# Patient Record
Sex: FEMALE | Race: WHITE | NOT HISPANIC OR LATINO | URBAN - METROPOLITAN AREA
[De-identification: names, ages, dates, MRNs, and addresses within clinical notes are randomized per-mention and may not be internally consistent; named-entity substitution may affect disease eponyms.]

---

## 2018-12-12 ENCOUNTER — OFFICE (OUTPATIENT)
Dept: URBAN - METROPOLITAN AREA CLINIC 75 | Facility: CLINIC | Age: 33
End: 2018-12-12

## 2018-12-12 VITALS
RESPIRATION RATE: 14 BRPM | SYSTOLIC BLOOD PRESSURE: 114 MMHG | HEART RATE: 64 BPM | HEIGHT: 66 IN | DIASTOLIC BLOOD PRESSURE: 68 MMHG | WEIGHT: 152 LBS

## 2018-12-12 DIAGNOSIS — R14.0 ABDOMINAL DISTENSION (GASEOUS): ICD-10-CM

## 2018-12-12 DIAGNOSIS — R11.2 NAUSEA WITH VOMITING, UNSPECIFIED: ICD-10-CM

## 2018-12-12 DIAGNOSIS — R19.7 DIARRHEA, UNSPECIFIED: ICD-10-CM

## 2018-12-12 PROCEDURE — 99204 OFFICE O/P NEW MOD 45 MIN: CPT | Performed by: NURSE PRACTITIONER

## 2018-12-12 RX ORDER — OMEPRAZOLE 40 MG/1
40 CAPSULE, DELAYED RELEASE ORAL
Qty: 30 | Refills: 4 | Status: ACTIVE
Start: 2018-12-12

## 2020-06-17 ENCOUNTER — OFFICE VISIT (OUTPATIENT)
Dept: INTERNAL MEDICINE | Facility: CLINIC | Age: 35
End: 2020-06-17

## 2020-06-17 VITALS — WEIGHT: 137 LBS | TEMPERATURE: 97.5 F | BODY MASS INDEX: 21.5 KG/M2 | HEIGHT: 67 IN

## 2020-06-17 DIAGNOSIS — E06.3 HYPOTHYROIDISM DUE TO HASHIMOTO'S THYROIDITIS: Primary | ICD-10-CM

## 2020-06-17 DIAGNOSIS — E03.8 HYPOTHYROIDISM DUE TO HASHIMOTO'S THYROIDITIS: Primary | ICD-10-CM

## 2020-06-17 DIAGNOSIS — H60.391 INFECTION OF EAR LOBE, RIGHT: ICD-10-CM

## 2020-06-17 PROBLEM — E03.9 HYPOTHYROIDISM: Status: ACTIVE | Noted: 2018-01-29

## 2020-06-17 PROBLEM — Q65.89 HIP DYSPLASIA: Status: ACTIVE | Noted: 2018-01-29

## 2020-06-17 PROCEDURE — 99203 OFFICE O/P NEW LOW 30 MIN: CPT | Performed by: INTERNAL MEDICINE

## 2020-06-17 RX ORDER — FEXOFENADINE HCL AND PSEUDOEPHEDRINE HCI 180; 240 MG/1; MG/1
1 TABLET, EXTENDED RELEASE ORAL DAILY
COMMUNITY
End: 2020-07-14 | Stop reason: SDUPTHER

## 2020-06-17 NOTE — PROGRESS NOTES
Assessment and Plan  Lyly was seen today for thyroid problem.    Diagnoses and all orders for this visit:    Hypothyroidism due to Hashimoto's thyroiditis  Comments:  check TSH today- other labs for health screening.   Orders:  -     TSH  -     Comprehensive Metabolic Panel  -     Lipid Panel With / Chol / HDL Ratio    Infection of ear lobe, right  Comments:  put in a small gold earring and do not manipulate area.       F/U and Patient Instructions    No follow-ups on file.  There are no Patient Instructions on file for this visit.    Subjective    Lyly OGDEN is a 34 y.o. female being seen in our office today for Thyroid Problem     History of the Present Illness  HPI Here to establish- 2 issues- had a baby 8/18 after 4 years of inferttility treatments.  She was put on levothyroxine during that period.  It was d/c'd at delivery.  Lately she notices that she is hot during the night- might have gotten better lately- can't stand sleeping close to her  during the night.   She menstruates regularly, had a tubal.   Hip dysplasia, repaired at 14 and final done at 17.    Patient History        Significant Past History  The following portions of the patient's history were reviewed and updated as appropriate:PMHroutine: Social history , Allergies, Current Medications, Active Problem List and Health Maintenance              Social History  She  reports that she has never smoked. She has never used smokeless tobacco. She reports that she drinks alcohol. She reports that she does not use drugs.                         Review of Symptoms  Review of Systems   Constitution: Negative.   HENT: Ear pain: infection of higher ear piercings.    Cardiovascular: Negative.    Respiratory: Negative.    Endocrine: Positive for heat intolerance.   Musculoskeletal: Negative.    Genitourinary: Negative.    Psychiatric/Behavioral: Negative.      Objective  Vital Signs         BP Readings from Last 1 Encounters:   No data found for  BP     Wt Readings from Last 3 Encounters:   06/17/20 62.1 kg (137 lb)   Body mass index is 21.78 kg/m².          Physical Exam   Physical Exam   Constitutional: No distress.   Neck: No thyromegaly present.   Cardiovascular: Normal rate and regular rhythm.   Musculoskeletal: She exhibits no edema.     Data Reviewed    No results found for this or any previous visit (from the past 2016 hour(s)).

## 2020-06-18 ENCOUNTER — TELEPHONE (OUTPATIENT)
Dept: INTERNAL MEDICINE | Facility: CLINIC | Age: 35
End: 2020-06-18

## 2020-06-18 LAB
ALBUMIN SERPL-MCNC: 4.5 G/DL (ref 3.5–5.2)
ALBUMIN/GLOB SERPL: 1.9 G/DL
ALP SERPL-CCNC: 46 U/L (ref 39–117)
ALT SERPL-CCNC: 9 U/L (ref 1–33)
AST SERPL-CCNC: 13 U/L (ref 1–32)
BILIRUB SERPL-MCNC: 0.4 MG/DL (ref 0.2–1.2)
BUN SERPL-MCNC: 14 MG/DL (ref 6–20)
BUN/CREAT SERPL: 15.9 (ref 7–25)
CALCIUM SERPL-MCNC: 9.1 MG/DL (ref 8.6–10.5)
CHLORIDE SERPL-SCNC: 100 MMOL/L (ref 98–107)
CHOLEST SERPL-MCNC: 181 MG/DL (ref 0–200)
CHOLEST/HDLC SERPL: 2.74 {RATIO}
CO2 SERPL-SCNC: 27.6 MMOL/L (ref 22–29)
CREAT SERPL-MCNC: 0.88 MG/DL (ref 0.57–1)
GLOBULIN SER CALC-MCNC: 2.4 GM/DL
GLUCOSE SERPL-MCNC: 84 MG/DL (ref 65–99)
HDLC SERPL-MCNC: 66 MG/DL (ref 40–60)
LDLC SERPL CALC-MCNC: 102 MG/DL (ref 0–100)
POTASSIUM SERPL-SCNC: 4.4 MMOL/L (ref 3.5–5.2)
PROT SERPL-MCNC: 6.9 G/DL (ref 6–8.5)
SODIUM SERPL-SCNC: 137 MMOL/L (ref 136–145)
TRIGL SERPL-MCNC: 65 MG/DL (ref 0–150)
TSH SERPL DL<=0.005 MIU/L-ACNC: 4.02 UIU/ML (ref 0.27–4.2)
VLDLC SERPL CALC-MCNC: 13 MG/DL

## 2020-07-14 ENCOUNTER — TELEPHONE (OUTPATIENT)
Dept: INTERNAL MEDICINE | Facility: CLINIC | Age: 35
End: 2020-07-14

## 2020-07-14 RX ORDER — FEXOFENADINE HCL AND PSEUDOEPHEDRINE HCI 180; 240 MG/1; MG/1
1 TABLET, EXTENDED RELEASE ORAL DAILY
Qty: 30 TABLET | Refills: 0 | Status: SHIPPED | OUTPATIENT
Start: 2020-07-14 | End: 2020-09-14 | Stop reason: SDUPTHER

## 2020-07-14 NOTE — TELEPHONE ENCOUNTER
Patients , curt, calling and states he would like to request she get a prescription for Allegra D. States she can no longer buy it OTC as she has reached the max amount allowable for the year.     [facility] on [address] [facility].    Curt can be reached at [phone].

## 2020-09-14 ENCOUNTER — TELEPHONE (OUTPATIENT)
Dept: INTERNAL MEDICINE | Facility: CLINIC | Age: 35
End: 2020-09-14

## 2020-09-14 RX ORDER — FEXOFENADINE HCL AND PSEUDOEPHEDRINE HCI 180; 240 MG/1; MG/1
1 TABLET, EXTENDED RELEASE ORAL DAILY
Qty: 30 TABLET | Refills: 0 | Status: SHIPPED | OUTPATIENT
Start: 2020-09-14 | End: 2021-10-05

## 2020-09-14 NOTE — TELEPHONE ENCOUNTER
PATIENT IS EXPERIENCING ALLERGIES AND IS REQUESTING ARLIN NGUYEN      CALL BACK #: [phone]     PHARMACY: [id number] [place], [place] - [address] AT [facility] [address] - [phone]  - [phone] FX

## 2020-11-20 ENCOUNTER — TELEMEDICINE (OUTPATIENT)
Dept: INTERNAL MEDICINE | Facility: CLINIC | Age: 35
End: 2020-11-20

## 2020-11-20 DIAGNOSIS — R09.89 CHEST CONGESTION: ICD-10-CM

## 2020-11-20 DIAGNOSIS — R09.81 SINUS CONGESTION: Primary | ICD-10-CM

## 2020-11-20 PROCEDURE — 99442 PR PHYS/QHP TELEPHONE EVALUATION 11-20 MIN: CPT | Performed by: NURSE PRACTITIONER

## 2020-11-20 RX ORDER — FLUTICASONE PROPIONATE 50 MCG
2 SPRAY, SUSPENSION (ML) NASAL DAILY
Start: 2020-11-20 | End: 2022-02-07

## 2020-11-20 RX ORDER — GUAIFENESIN 600 MG/1
1200 TABLET, EXTENDED RELEASE ORAL 2 TIMES DAILY
Start: 2020-11-20 | End: 2022-02-07

## 2020-11-20 NOTE — PROGRESS NOTES
Subjective   Chief Complaint   Patient presents with   • Sinusitis     You have chosen to receive care through a telephone visit. Do you consent to use a telephone visit for your medical care today? YES    History of Present Illness   35 y.o. female presents via telehealth regarding cc as above ongoing times several days.     Her toddler has really bad allergies but had croup a couple of weeks ago. She now has it too. Started with sinus congestion and has moved into her lungs. No cough just congestion. When she blows her nose or forces herself to cough she has clear mucus/sputum. Moved into her lungs last week. Denies fever or chills. No body aches. Not lost her sense of smell or taste. No body aches or dyspnea. Using Robitussin, Nyquil and humidifier.     Her daughter gets croup and she always gets it too. Pediatrician did not feel she needed COVID testing. Tells me this is the pattern she always has and does not need COVID testing.     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 30 tablet 0     No current facility-administered medications on file prior to visit.        History reviewed. No pertinent past medical history.    History reviewed. No pertinent family history.    Social History     Socioeconomic History   • Marital status:      Spouse name: Not on file   • Number of children: Not on file   • Years of education: Not on file   • Highest education level: Not on file   Tobacco Use   • Smoking status: Never Smoker   • Smokeless tobacco: Never Used   Substance and Sexual Activity   • Alcohol use: Yes   • Drug use: Never       Past Surgical History:    Procedure Laterality Date   •  SECTION     • HIP SURGERY         The following portions of the patient's history were reviewed and updated as appropriate: problem list, allergies, current medications, past medical history and past social history.    Review of Systems   Constitutional: Negative for chills, fatigue and fever.   HENT: Positive for congestion.    Respiratory: Negative for cough and shortness of breath.    Cardiovascular: Negative for chest pain.        Chest congestion.    Musculoskeletal: Negative for myalgias.       Immunization History   Administered Date(s) Administered   • Flu Vaccine Quad PF >36MO 2018   • Tdap 2018       Objective   Vitals:     There is no height or weight on file to calculate BMI.  Physical Exam  Vitals reviewed: Telephone visit.          Procedures    Assessment/Plan   Diagnoses and all orders for this visit:    1. Sinus congestion (Primary)  -     guaiFENesin (Mucinex) 600 MG 12 hr tablet; Take 2 tablets by mouth 2 (Two) Times a Day.  -     fluticasone (Flonase) 50 MCG/ACT nasal spray; 2 sprays into the nostril(s) as directed by provider Daily.  Dispense:      2. Chest congestion  -     guaiFENesin (Mucinex) 600 MG 12 hr tablet; Take 2 tablets by mouth 2 (Two) Times a Day.    Will start Stahist she has at home as well--understands to hold Allegra while taking Stahist.     Discussed COVID testing but adamant she does not have COVID stating this is her typical pattern this time of year. Understands to let us know should anything change or new symptoms develop; otherwise will treat symptomatically.     Records reviewed include previous OV as well as labs.     15 minutes spent via telehealth with patient.      Return for Next scheduled follow up.

## 2021-04-16 ENCOUNTER — BULK ORDERING (OUTPATIENT)
Dept: CASE MANAGEMENT | Facility: OTHER | Age: 36
End: 2021-04-16

## 2021-04-16 DIAGNOSIS — Z23 IMMUNIZATION DUE: ICD-10-CM

## 2021-05-20 ENCOUNTER — OFFICE VISIT (OUTPATIENT)
Dept: INTERNAL MEDICINE | Facility: CLINIC | Age: 36
End: 2021-05-20

## 2021-05-20 VITALS — TEMPERATURE: 97.3 F | WEIGHT: 138 LBS | HEIGHT: 67 IN | BODY MASS INDEX: 21.66 KG/M2

## 2021-05-20 DIAGNOSIS — L23.89 ALLERGIC CONTACT DERMATITIS DUE TO OTHER AGENTS: Primary | ICD-10-CM

## 2021-05-20 PROCEDURE — 99213 OFFICE O/P EST LOW 20 MIN: CPT | Performed by: NURSE PRACTITIONER

## 2021-05-20 RX ORDER — CETIRIZINE HYDROCHLORIDE 10 MG/1
10 TABLET ORAL DAILY
Start: 2021-05-20 | End: 2021-10-05

## 2021-05-20 NOTE — PROGRESS NOTES
Subjective   Chief Complaint   Patient presents with   • Rash       History of Present Illness   35 y.o. female presnet with cc as above ongoing times 5 days. Wore a new pair of legs prior to washing them. Broke out in rash the next day. Very itchy. Rash up and down legs up to her gluts bilaterally. Used cortisone cream.. Improved. Recurred this morning but after using Cortisone cream it is gone again.      Patient Active Problem List   Diagnosis   • Hypothyroidism   • Hip dysplasia       No Known Allergies    Current Outpatient Medications on File Prior to Visit   Medication Sig Dispense Refill   • fexofenadine-pseudoephedrine (ALLEGRA-D 24) 180-240 MG per 24 hr tablet Take 1 tablet by mouth Daily. 30 tablet 0   • fluticasone (Flonase) 50 MCG/ACT nasal spray 2 sprays into the nostril(s) as directed by provider Daily.     • guaiFENesin (Mucinex) 600 MG 12 hr tablet Take 2 tablets by mouth 2 (Two) Times a Day.       No current facility-administered medications on file prior to visit.       History reviewed. No pertinent past medical history.    History reviewed. No pertinent family history.    Social History     Socioeconomic History   • Marital status:      Spouse name: Not on file   • Number of children: Not on file   • Years of education: Not on file   • Highest education level: Not on file   Tobacco Use   • Smoking status: Never Smoker   • Smokeless tobacco: Never Used   Substance and Sexual Activity   • Alcohol use: Yes   • Drug use: Never       Past Surgical History:   Procedure Laterality Date   •  SECTION     • HIP SURGERY         The following portions of the patient's history were reviewed and updated as appropriate: problem list, allergies, current medications, past medical history and past social history.    Review of Systems    Immunization History   Administered Date(s) Administered   • Flu Vaccine Quad PF >36MO 2018   • Tdap 2018       Objective   Vitals:    21 1524  "  Temp: 97.3 °F (36.3 °C)   Weight: 62.6 kg (138 lb)   Height: 168.9 cm (66.5\")     Body mass index is 21.94 kg/m².  Physical Exam  Constitutional:       Appearance: Normal appearance. She is normal weight.   HENT:      Head: Normocephalic and atraumatic.   Skin:     General: Skin is warm and dry.      Findings: No rash.   Neurological:      Mental Status: She is alert.   Psychiatric:         Mood and Affect: Mood normal.         Behavior: Behavior normal.         Procedures    Assessment/Plan   Diagnoses and all orders for this visit:    1. Allergic contact dermatitis due to other agents (Primary)  Comments:  After wearing new pair of legs without washing them first. Improved with Cortisone. Daily Zyrtec for 10 days--hold Allegra. Will call if continues or recurs.   Orders:  -     cetirizine (zyrTEC) 10 MG tablet; Take 1 tablet by mouth Daily.        Return for Next scheduled follow up.           "

## 2021-05-26 ENCOUNTER — TELEPHONE (OUTPATIENT)
Dept: INTERNAL MEDICINE | Facility: CLINIC | Age: 36
End: 2021-05-26

## 2021-05-26 DIAGNOSIS — L24.5 IRRITANT CONTACT DERMATITIS DUE TO OTHER CHEMICAL PRODUCTS: Primary | ICD-10-CM

## 2021-05-26 RX ORDER — METHYLPREDNISOLONE 4 MG/1
TABLET ORAL
Qty: 1 EACH | Refills: 0 | Status: SHIPPED | OUTPATIENT
Start: 2021-05-26 | End: 2021-10-05

## 2021-05-26 NOTE — TELEPHONE ENCOUNTER
PT was seen last week for rash that was likely caused by new leggings.  PT said it did get better as long as she wore loose fitting pants.  Yesterday she wore a tighter pair and the rash came back worse.  She states was told to call if worsen for steriods    Please advise

## 2021-05-26 NOTE — TELEPHONE ENCOUNTER
Caller: MELVI Lyly    Relationship: Self    Best call back number:[phone]   What medication are you requesting: STEROID  What are your current symptoms:  CONTACT DERM, BACLOF LEGS   How long have you been experiencing symptoms: SHE WAS SEEN FOR IT THIS WEEK, BUT IT IS WORSE    Have you had these symptoms before:    [x] Yes  [] No    Have you been treated for these symptoms before:   [x] Yes  [] No    If a prescription is needed, what is your preferred pharmacy and phone number: [facility] #[id number] [place], [place] - [address] AT [facility] [address] - [phone]  - [phone] FX     Additional notes:SHE IS NEEDING ASAP

## 2021-10-05 ENCOUNTER — OFFICE VISIT (OUTPATIENT)
Dept: INTERNAL MEDICINE | Facility: CLINIC | Age: 36
End: 2021-10-05

## 2021-10-05 VITALS — HEIGHT: 67 IN | WEIGHT: 136 LBS | TEMPERATURE: 97.3 F | BODY MASS INDEX: 21.35 KG/M2

## 2021-10-05 DIAGNOSIS — E88.1: Primary | ICD-10-CM

## 2021-10-05 DIAGNOSIS — L50.9 URTICARIA: ICD-10-CM

## 2021-10-05 PROCEDURE — 99213 OFFICE O/P EST LOW 20 MIN: CPT | Performed by: INTERNAL MEDICINE

## 2021-10-05 RX ORDER — FEXOFENADINE HCL 180 MG/1
180 TABLET ORAL DAILY
COMMUNITY

## 2021-10-05 NOTE — PROGRESS NOTES
"Chief Complaint  No chief complaint on file.    Uriel OGDEN presents to [facility]  History of Present Illness  Has had tenderness of coccyx for about 6 months since she's been riding her Peloton bike. She also noticed an indentation of the L buttock that she had not noticed in the past.  There is no soreness, decreased ROM.  She had that hip remodeled at age 14-   Treated and evaluated for hives    Objective   Vital Signs:   Temp 97.3 °F (36.3 °C)   Ht 168.9 cm (66.5\")   Wt 61.7 kg (136 lb)   BMI 21.62 kg/m²     Physical Exam  Constitutional:       Appearance: Normal appearance.   Skin:     Comments: Small indentation in fat of L buttock, no tenderness, full ROM        Result Review :                 Assessment and Plan    Diagnoses and all orders for this visit:    1. Dystrophy of fatty tissue (Primary)  Comments:  L buttock, reassured- I suspect related to her hip and maybe some malaignment.   Will follow since she remains asymptomatic.     2. Urticaria  Comments:  will call here if she has a recurrence for further evaluation.         Follow Up   No follow-ups on file.  Patient was given instructions and counseling regarding her condition or for health maintenance advice. Please see specific information pulled into the AVS if appropriate.       "

## 2022-02-07 ENCOUNTER — OFFICE VISIT (OUTPATIENT)
Dept: INTERNAL MEDICINE | Facility: CLINIC | Age: 37
End: 2022-02-07

## 2022-02-07 VITALS — TEMPERATURE: 97.1 F | HEIGHT: 67 IN | WEIGHT: 149 LBS | BODY MASS INDEX: 23.39 KG/M2

## 2022-02-07 DIAGNOSIS — R07.89 CHEST WALL PAIN: Primary | ICD-10-CM

## 2022-02-07 PROCEDURE — 99213 OFFICE O/P EST LOW 20 MIN: CPT | Performed by: INTERNAL MEDICINE

## 2022-02-07 NOTE — PROGRESS NOTES
"Chief Complaint  Chest Pain    Subjective          Lyly OGDEN presents to [facility]  History of Present Illness Chest pain for 2-3 weeks- primarily substernal- best in the am but comes on as soon as she's awake.  Stress makes it worse. She feels some overall stress - sleep ok. She does Peloton but doesn't make it worse.  No cough/recent illness.   Had Covid in Nov but feels back to baseline.  Sometimes in the winter she gets low mood.  Diet hasn't changed, weight is up a little.  No meds.   Saw gyn for breast pain- R side - had mmg- thinks may be related to wearing an underwire bra night.     Objective   Vital Signs:   Temp 97.1 °F (36.2 °C)   Ht 168.9 cm (66.5\")   Wt 67.6 kg (149 lb)   BMI 23.69 kg/m²     Physical Exam  Constitutional:       General: She is not in acute distress.     Appearance: Normal appearance.   Cardiovascular:      Rate and Rhythm: Normal rate and regular rhythm.   Pulmonary:      Effort: Pulmonary effort is normal.      Breath sounds: Normal breath sounds.   Chest:      Chest wall: Tenderness: mild along L sternal border.   Musculoskeletal:         General: Normal range of motion.      Right lower leg: No edema.      Left lower leg: No edema.        Result Review :                 Assessment and Plan    Diagnoses and all orders for this visit:    1. Chest wall pain (Primary)  Comments:  long discussion- ? exercise vs anxiety.  Wants to observe now that she knows I don't think it's cardiac.  Will try CBD and report back if she has increase/robles  Orders:  -     XR Chest 2 View        Follow Up   No follow-ups on file.  Patient was given instructions and counseling regarding her condition or for health maintenance advice. Please see specific information pulled into the AVS if appropriate.       "

## 2022-03-23 ENCOUNTER — OFFICE VISIT (OUTPATIENT)
Dept: INTERNAL MEDICINE | Facility: CLINIC | Age: 37
End: 2022-03-23

## 2022-03-23 VITALS — HEART RATE: 70 BPM | OXYGEN SATURATION: 96 % | TEMPERATURE: 98.1 F

## 2022-03-23 DIAGNOSIS — J06.9 UPPER RESPIRATORY TRACT INFECTION, UNSPECIFIED TYPE: Primary | ICD-10-CM

## 2022-03-23 PROCEDURE — 99214 OFFICE O/P EST MOD 30 MIN: CPT | Performed by: PHYSICIAN ASSISTANT

## 2022-03-23 RX ORDER — FLUCONAZOLE 150 MG/1
150 TABLET ORAL ONCE
Qty: 2 TABLET | Refills: 0 | Status: SHIPPED | OUTPATIENT
Start: 2022-03-23 | End: 2022-03-23

## 2022-03-23 RX ORDER — AMOXICILLIN AND CLAVULANATE POTASSIUM 875; 125 MG/1; MG/1
1 TABLET, FILM COATED ORAL 2 TIMES DAILY
Qty: 14 TABLET | Refills: 0 | Status: SHIPPED | OUTPATIENT
Start: 2022-03-23 | End: 2022-03-30

## 2022-03-23 NOTE — PROGRESS NOTES
Subjective   Chief Complaint   Patient presents with   • URI   Pt in mask and I was in full PPE during visit.     History of Present Illness     Started with upper respiratory symptoms of sore throat, sinus pressure and head congestion several days ago. Now has a cough, some sputum production. No fever or chills.  had similar symptoms a week or so ago, he was COVID negative.      Patient Active Problem List   Diagnosis   • Hypothyroidism   • Hip dysplasia       No Known Allergies    Current Outpatient Medications on File Prior to Visit   Medication Sig Dispense Refill   • fexofenadine (ALLEGRA) 180 MG tablet Take 180 mg by mouth Daily.       No current facility-administered medications on file prior to visit.       No past medical history on file.    No family history on file.    Social History     Socioeconomic History   • Marital status:    Tobacco Use   • Smoking status: Never Smoker   • Smokeless tobacco: Never Used   Substance and Sexual Activity   • Alcohol use: Yes   • Drug use: Never       Past Surgical History:   Procedure Laterality Date   •  SECTION     • HIP SURGERY           The following portions of the patient's history were reviewed and updated as appropriate: problem list, allergies, current medications, past medical history, past family history, past social history and past surgical history.    ROS     See HPI    Immunization History   Administered Date(s) Administered   • Flu Vaccine Quad PF >36MO 2018   • Tdap 2018       Objective   Vitals:    22 1056   Pulse: 70   Temp: 98.1 °F (36.7 °C)   SpO2: 96%     There is no height or weight on file to calculate BMI.  Physical Exam  Vitals reviewed.   Constitutional:       Appearance: She is well-developed.   HENT:      Head: Normocephalic and atraumatic.      Right Ear: Ear canal normal.      Left Ear: Ear canal normal.      Mouth/Throat:      Pharynx: Posterior oropharyngeal erythema present.   Cardiovascular:       Rate and Rhythm: Normal rate and regular rhythm.      Heart sounds: Normal heart sounds, S1 normal and S2 normal.   Pulmonary:      Effort: Pulmonary effort is normal.      Breath sounds: Normal breath sounds.   Skin:     General: Skin is warm.   Neurological:      Mental Status: She is alert.   Psychiatric:         Behavior: Behavior normal.       Assessment/Plan   Diagnoses and all orders for this visit:    1. Upper respiratory tract infection, unspecified type (Primary)  -     amoxicillin-clavulanate (Augmentin) 875-125 MG per tablet; Take 1 tablet by mouth 2 (Two) Times a Day for 7 days.  Dispense: 14 tablet; Refill: 0  -     fluconazole (Diflucan) 150 MG tablet; Take 1 tablet by mouth 1 (One) Time for 1 dose.  Dispense: 2 tablet; Refill: 0

## 2022-05-16 ENCOUNTER — OFFICE VISIT (OUTPATIENT)
Dept: INTERNAL MEDICINE | Facility: CLINIC | Age: 37
End: 2022-05-16

## 2022-05-16 VITALS
SYSTOLIC BLOOD PRESSURE: 104 MMHG | DIASTOLIC BLOOD PRESSURE: 58 MMHG | WEIGHT: 142 LBS | HEART RATE: 80 BPM | HEIGHT: 67 IN | TEMPERATURE: 97.8 F | BODY MASS INDEX: 22.29 KG/M2

## 2022-05-16 DIAGNOSIS — H65.193 ACUTE MEE (MIDDLE EAR EFFUSION), BILATERAL: ICD-10-CM

## 2022-05-16 DIAGNOSIS — L50.9 URTICARIA: Primary | ICD-10-CM

## 2022-05-16 PROCEDURE — 99214 OFFICE O/P EST MOD 30 MIN: CPT | Performed by: INTERNAL MEDICINE

## 2022-05-16 RX ORDER — FAMOTIDINE 40 MG/1
40 TABLET, FILM COATED ORAL DAILY
COMMUNITY
End: 2023-02-22

## 2022-05-16 RX ORDER — MONTELUKAST SODIUM 10 MG/1
10 TABLET ORAL NIGHTLY
Qty: 90 TABLET | Refills: 0 | Status: SHIPPED | OUTPATIENT
Start: 2022-05-16

## 2022-05-16 RX ORDER — DIPHENHYDRAMINE HCL 25 MG
25 CAPSULE ORAL EVERY 6 HOURS PRN
COMMUNITY
End: 2023-02-22

## 2022-05-16 NOTE — PROGRESS NOTES
"Chief Complaint  Urticaria    Subjective          Lyly OGDEN presents to [facility]  History of Present Illness  Hives again this spring, had the same thing last spring for about 8 weeks.  She went to derm and had bx to confirm. Nothing done about it over the rest of the year.  She had the same thing again in April.  She has increased her antihistamines to 2 Allegra, 2 Pepcid.  Seeing allergist later this month.  Denies any wheezing, SOB.   Ears feel full, no sinus issues.  She had a hard time on the plane yesterday with a lot of pressure.     Objective   Vital Signs:  /58   Pulse 80   Temp 97.8 °F (36.6 °C)   Ht 168.9 cm (66.5\")   Wt 64.4 kg (142 lb)   BMI 22.58 kg/m²     BMI is within normal parameters. No follow-up required.      Physical Exam  Constitutional:       Appearance: Normal appearance.   HENT:      Right Ear: External ear normal. A middle ear effusion is present.      Left Ear: External ear normal. A middle ear effusion is present.   Cardiovascular:      Rate and Rhythm: Normal rate.   Pulmonary:      Breath sounds: No wheezing.   Musculoskeletal:      Right lower leg: No edema.      Left lower leg: No edema.   Skin:     General: Skin is warm and dry.      Comments: Post inflammatory changes in arms, abd        Result Review :                 Assessment and Plan    Diagnoses and all orders for this visit:    1. Urticaria (Primary)  Comments:  add Singulair, agreewith allergist eval but doubt they will be able to test now- cont to treat symptoms     2. Acute ALEXANDRA (middle ear effusion), bilateral  Comments:  add Mucinex- could try nasal spray for a very short term.     Other orders  -     montelukast (Singulair) 10 MG tablet; Take 1 tablet by mouth Every Night.  Dispense: 90 tablet; Refill: 0             Follow Up   No follow-ups on file.  Patient was given instructions and counseling regarding her condition or for health maintenance advice. Please see specific " information pulled into the AVS if appropriate.

## 2023-02-22 ENCOUNTER — TELEPHONE (OUTPATIENT)
Dept: INTERNAL MEDICINE | Facility: CLINIC | Age: 38
End: 2023-02-22

## 2023-02-22 ENCOUNTER — OFFICE VISIT (OUTPATIENT)
Dept: INTERNAL MEDICINE | Facility: CLINIC | Age: 38
End: 2023-02-22
Payer: COMMERCIAL

## 2023-02-22 VITALS — BODY MASS INDEX: 22.44 KG/M2 | WEIGHT: 143 LBS | HEIGHT: 67 IN

## 2023-02-22 DIAGNOSIS — R19.05 PERIUMBILICAL MASS: Primary | ICD-10-CM

## 2023-02-22 PROCEDURE — 99213 OFFICE O/P EST LOW 20 MIN: CPT | Performed by: INTERNAL MEDICINE

## 2023-02-22 NOTE — PROGRESS NOTES
"Chief Complaint  bump stomach area    Subjective        Lyly Cortez presents to [facility]  History of Present Illness started having abd tenderness on Monday- over the weekend, jumped at a trampoline park and did a hard HIIT workout but never felt any pain.  Noticed tenderness where her pants hit on Monday- has become increasingly sore- no change in bowel habits, appetite. Can't make worse or better with movement or position, only if something presses on the area. Noticed a \"lump\" there yesterday.     Objective   Vital Signs:  Ht 168.9 cm (66.5\")   Wt 64.9 kg (143 lb)   BMI 22.74 kg/m²   Estimated body mass index is 22.74 kg/m² as calculated from the following:    Height as of this encounter: 168.9 cm (66.5\").    Weight as of this encounter: 64.9 kg (143 lb).       BMI is within normal parameters. No other follow-up for BMI required.      Physical Exam  Constitutional:       Appearance: Normal appearance.   Cardiovascular:      Rate and Rhythm: Normal rate.   Pulmonary:      Effort: Pulmonary effort is normal.      Breath sounds: Normal breath sounds.   Abdominal:      General: Abdomen is flat. Bowel sounds are normal. There are no signs of injury.      Palpations: Abdomen is soft. Mass: just above umbilicu.      Tenderness: There is abdominal tenderness in the periumbilical area.      Comments: I don't appreciate a discrete hernia but in the area of concern, there is a tender, nonmobile mass- approx grape sized.         Result Review :                   Assessment and Plan   Diagnoses and all orders for this visit:    1. Periumbilical mass (Primary)  Comments:  discussed differential, includes hernia- check CT since so tender and acute. to call or seek urgent attention if worsens.   Orders:  -     CT Abdomen Pelvis With Contrast; Future             Follow Up   No follow-ups on file.  Patient was given instructions and counseling regarding her condition or for health maintenance " advice. Please see specific information pulled into the AVS if appropriate.

## 2023-02-22 NOTE — TELEPHONE ENCOUNTER
Caller: Lyly OGDEN    Relationship: Self    Best call back number: [phone]    What is the best time to reach you: ASAP    Who are you requesting to speak with (clinical staff, provider,  specific staff member): CLINICAL STAFF    What was the call regarding: PATIENT STATES SHE IS EXPERIENCING ABDOMEN PAIN NEAR HER BELLY BUTTON AND SHE FEELS A KNOT THAT IS TENDER. PATIENT IS SUPPOSED TO GO OUT OF TOWN TODAY AND SHE WOULD LIKE A CALL BACK TO BE ADVISED ON WHETHER SHE SHOULD CANCEL AND BE SEEN OR WILL SHE BE OKAY WAITING UNTIL TOMORROW. PLEASE CALL     Do you require a callback: YES

## 2023-02-23 ENCOUNTER — TELEPHONE (OUTPATIENT)
Dept: INTERNAL MEDICINE | Facility: CLINIC | Age: 38
End: 2023-02-23

## 2023-02-23 NOTE — TELEPHONE ENCOUNTER
Caller: CortezLyly    Relationship: Self    Best call back number: [phone]    Caller requesting test results: SELF    What test was performed: CT SCAN    When was the test performed: 2/23    Where was the test performed: HOSPITAL     Additional notes: PATIENT WAS ABLE TO GO THIS MORNING, THEY TOLD HER THAT THEY WOULD BE FAXING RESULTS OVER WITHIN 24-48 HOURS.

## 2023-02-28 NOTE — TELEPHONE ENCOUNTER
PATIENT IS CALLING BACK TO SPEAK WITH DR ANICETO RDZ.    UNABLE TO WARM TRANSFER.    PATIENT CALL BACK [phone]

## 2024-04-09 ENCOUNTER — TELEMEDICINE (OUTPATIENT)
Dept: INTERNAL MEDICINE | Facility: CLINIC | Age: 39
End: 2024-04-09
Payer: COMMERCIAL

## 2024-04-09 DIAGNOSIS — J06.9 UPPER RESPIRATORY TRACT INFECTION, UNSPECIFIED TYPE: Primary | ICD-10-CM

## 2024-04-09 PROCEDURE — 99213 OFFICE O/P EST LOW 20 MIN: CPT | Performed by: PHYSICIAN ASSISTANT

## 2024-04-09 RX ORDER — FLUCONAZOLE 150 MG/1
150 TABLET ORAL ONCE
Qty: 2 TABLET | Refills: 0 | Status: SHIPPED | OUTPATIENT
Start: 2024-04-09 | End: 2024-04-09

## 2024-04-09 RX ORDER — CEFDINIR 300 MG/1
300 CAPSULE ORAL 2 TIMES DAILY
Qty: 14 CAPSULE | Refills: 0 | Status: SHIPPED | OUTPATIENT
Start: 2024-04-09 | End: 2024-04-16

## 2024-04-09 NOTE — PROGRESS NOTES
Subjective   Chief Complaint   Patient presents with    Nasal Congestion   Mode of Visit: Video  Location of patient: home  Location of provider: [facility]  You have chosen to receive care through a telehealth visit.  Does the patient consent to use a video/audio connection for your medical care today? Yes  The visit included audio and video interaction. No technical issues occurred during this visit.       History of Present Illness     Pt woke up a week ago with runny nose, has progressed last few days now in her chest. She is coughing up yellow sputum. Has been taking otc meds, no improvement. No fever she is aware of.      Patient Active Problem List   Diagnosis    Hypothyroidism    Hip dysplasia       No Known Allergies    Current Outpatient Medications on File Prior to Visit   Medication Sig Dispense Refill    fexofenadine (ALLEGRA) 180 MG tablet Take 180 mg by mouth Daily.      montelukast (Singulair) 10 MG tablet Take 1 tablet by mouth Every Night. 90 tablet 0     No current facility-administered medications on file prior to visit.       No past medical history on file.    No family history on file.    Social History     Socioeconomic History    Marital status:    Tobacco Use    Smoking status: Never    Smokeless tobacco: Never   Substance and Sexual Activity    Alcohol use: Yes    Drug use: Never       Past Surgical History:   Procedure Laterality Date     SECTION      HIP SURGERY         The following portions of the patient's history were reviewed and updated as appropriate: problem list, allergies, current medications, past medical history, past family history, past social history, and past surgical history.    ROS    See HPI    Immunization History   Administered Date(s) Administered    Flu Vaccine Quad PF >36MO 2018    Tdap 2018       Objective   There were no vitals filed for this visit.  There is no height or weight on file to calculate BMI.  Physical  Exam  Constitutional:       Appearance: Normal appearance.   HENT:      Head: Normocephalic and atraumatic.   Neurological:      Mental Status: She is alert.           Assessment & Plan   Diagnoses and all orders for this visit:    1. Upper respiratory tract infection, unspecified type (Primary)    Other orders  -     cefdinir (OMNICEF) 300 MG capsule; Take 1 capsule by mouth 2 (Two) Times a Day for 7 days.  Dispense: 14 capsule; Refill: 0  -     fluconazole (DIFLUCAN) 150 MG tablet; Take 1 tablet by mouth 1 (One) Time for 1 dose.  Dispense: 2 tablet; Refill: 0         No follow-ups on file.